# Patient Record
Sex: MALE | Race: WHITE | Employment: UNEMPLOYED | URBAN - METROPOLITAN AREA
[De-identification: names, ages, dates, MRNs, and addresses within clinical notes are randomized per-mention and may not be internally consistent; named-entity substitution may affect disease eponyms.]

---
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
## 2020-02-01 ENCOUNTER — HOSPITAL ENCOUNTER (EMERGENCY)
Age: 20
Discharge: HOME | End: 2020-02-01

## 2020-02-01 VITALS
TEMPERATURE: 97.8 F | HEIGHT: 69 IN | SYSTOLIC BLOOD PRESSURE: 131 MMHG | HEART RATE: 69 BPM | RESPIRATION RATE: 18 BRPM | BODY MASS INDEX: 22.22 KG/M2 | DIASTOLIC BLOOD PRESSURE: 75 MMHG | WEIGHT: 150 LBS | OXYGEN SATURATION: 97 %

## 2020-02-01 PROCEDURE — 99282 EMERGENCY DEPT VISIT SF MDM: CPT

## 2020-02-01 ASSESSMENT — PAIN SCALES - GENERAL: PAINLEVEL_OUTOF10: 6

## 2020-02-01 ASSESSMENT — PAIN DESCRIPTION - LOCATION: LOCATION: THROAT

## 2020-02-01 ASSESSMENT — PAIN DESCRIPTION - PAIN TYPE: TYPE: ACUTE PAIN

## 2020-02-01 NOTE — ED PROVIDER NOTES
eMERGENCY dEPARTMENT eNCOUnter        [address] [facility]    Chief Complaint   Patient presents with    Pharyngitis       HPI    Mirna Davis is a 23 y.o. male who presents with ST, dry cough, headache, left ear pain, body aches for the last 3 days. Girlfriend was sick with similar illness and is better now. Pain is 6 out of 10, worst in the throat, sharp, worse with swallowing, constant. Able to eat and drink. No fevers. No medical problems. REVIEW OF SYSTEMS    See HPI for further details. Review of systems otherwise negative. Constitutional:  No fever. HENT:  + headache, + L earache, + nasal congestion, no sinus pressure, + sore throat  Respiratory:  + cough, no sob. Cardiovascular:  Denies chest pain. GI:  Denies nausea, vomiting. + diarrhea. Musculoskeletal:  Denies edema, tenderness. Integument:  Denies rashes. PAST MEDICAL HISTORY    History reviewed. No pertinent past medical history. SURGICAL HISTORY    History reviewed. No pertinent surgical history. CURRENT MEDICATIONS        ALLERGIES    No Known Allergies    FAMILY HISTORY    History reviewed. No pertinent family history.     SOCIAL HISTORY    Social History     Socioeconomic History    Marital status:      Spouse name: None    Number of children: None    Years of education: None    Highest education level: None   Occupational History    None   Social Needs    Financial resource strain: None    Food insecurity:     Worry: None     Inability: None    Transportation needs:     Medical: None     Non-medical: None   Tobacco Use    Smoking status: Never Smoker    Smokeless tobacco: Never Used   Substance and Sexual Activity    Alcohol use: Never     Frequency: Never    Drug use: Never    Sexual activity: None   Lifestyle    Physical activity:     Days per week: None     Minutes per session: None    Stress: None   Relationships    Social connections:     Talks on phone: None     Gets together: None     Attends Pentecostalism service: None     Active member of club or organization: None     Attends meetings of clubs or organizations: None     Relationship status: None    Intimate partner violence:     Fear of current or ex partner: None     Emotionally abused: None     Physically abused: None     Forced sexual activity: None   Other Topics Concern    None   Social History Narrative    ** Merged History Encounter **            PHYSICAL EXAM    VITAL SIGNS: /75   Pulse 69   Temp 97.8 °F (36.6 °C) (Oral)   Resp 18   Ht 5' 9\" (1.753 m)   Wt 150 lb (68 kg)   SpO2 97%   BMI 22.15 kg/m²   GENERAL:  Well-developed, well-nourished, no acute distress  EYES:   PERRL, EOMI. Conjunctiva normal.  HENT:  NC/AT. External ears normal, canals patent and nonerythematous bilaterally, left EAC 80% obstructed with cerumen, TMs intact and noninjected bilaterally. Nares patent. No sinus tenderness to palpation/percussion. Oropharynx moist and pink. No posterior pharyngeal erythema. no tonsillar edema, no exudates. Uvula midline. LUNGS:  Lungs CTAB, no rales or stridor or wheezing. CARDIAC:   RRR. No murmurs. ABDOMEN:  Abdomen soft, non-tender. Bowel sounds active. EXTREMITIES:   No edema. DP intact and symmetric. SKIN:   Warm and dry. NEURO:  Alert and oriented. No focal deficits. LYMPH:  No cervical lymphadenopathy. RADIOLOGY/PROCEDURES        ED COURSE & MEDICAL DECISION MAKING    Pertinent Labs & Imaging studies reviewed. (See chart for details)  -  Patient seen and evaluated in the emergency department. -  Triage and nursing notes reviewed and incorporated. -  Old chart records reviewed and incorporated. -  I evaluated this patient independently  -  Differential diagnosis includes: upper respiratory infection, sinusitis, influenza, pneumonia, mononucleosis, and others  -  Patient discharged home. Clinical impression is viral illness.   Discussed OTC meds for symptom control and to remove cerumen in left ear.  Recommended increased oral hydration. Patient to follow-up with PCP in 2-3 days. Return to the Emergency Department for new/worsening symptoms as needed. Patient agreeable with plan of care and disposition    FINAL IMPRESSION    1. Viral URI with cough    2.  Impacted cerumen of left ear             Patricia Frost PA-C  02/01/20 2215

## 2020-02-01 NOTE — ED TRIAGE NOTES
Pt presents to the ER with complaints of sore throat; pt states that he has had the sore throat for the past few days

## 2020-07-02 ENCOUNTER — HOSPITAL ENCOUNTER (EMERGENCY)
Age: 20
Discharge: HOME | End: 2020-07-02

## 2020-07-02 ENCOUNTER — APPOINTMENT (OUTPATIENT)
Dept: GENERAL RADIOLOGY | Age: 20
End: 2020-07-02

## 2020-07-02 VITALS
SYSTOLIC BLOOD PRESSURE: 115 MMHG | TEMPERATURE: 98.4 F | HEART RATE: 77 BPM | BODY MASS INDEX: 22.22 KG/M2 | WEIGHT: 150 LBS | DIASTOLIC BLOOD PRESSURE: 80 MMHG | HEIGHT: 69 IN | OXYGEN SATURATION: 98 % | RESPIRATION RATE: 16 BRPM

## 2020-07-02 PROCEDURE — 99283 EMERGENCY DEPT VISIT LOW MDM: CPT

## 2020-07-02 PROCEDURE — 6370000000 HC RX 637 (ALT 250 FOR IP): Performed by: PHYSICIAN ASSISTANT

## 2020-07-02 PROCEDURE — 96374 THER/PROPH/DIAG INJ IV PUSH: CPT

## 2020-07-02 PROCEDURE — 73030 X-RAY EXAM OF SHOULDER: CPT

## 2020-07-02 PROCEDURE — 6360000002 HC RX W HCPCS: Performed by: PHYSICIAN ASSISTANT

## 2020-07-02 RX ORDER — NAPROXEN 500 MG/1
500 TABLET ORAL 2 TIMES DAILY PRN
Qty: 20 TABLET | Refills: 0 | Status: SHIPPED | OUTPATIENT
Start: 2020-07-02

## 2020-07-02 RX ORDER — ACETAMINOPHEN 500 MG
500 TABLET ORAL EVERY 6 HOURS PRN
Qty: 20 TABLET | Refills: 0 | Status: SHIPPED | OUTPATIENT
Start: 2020-07-02

## 2020-07-02 RX ORDER — LIDOCAINE 4 G/G
1 PATCH TOPICAL ONCE
Status: DISCONTINUED | OUTPATIENT
Start: 2020-07-02 | End: 2020-07-02 | Stop reason: HOSPADM

## 2020-07-02 RX ORDER — KETOROLAC TROMETHAMINE 30 MG/ML
30 INJECTION, SOLUTION INTRAMUSCULAR; INTRAVENOUS ONCE
Status: COMPLETED | OUTPATIENT
Start: 2020-07-02 | End: 2020-07-02

## 2020-07-02 RX ADMIN — KETOROLAC TROMETHAMINE 30 MG: 30 INJECTION, SOLUTION INTRAMUSCULAR; INTRAVENOUS at 08:45

## 2020-07-02 ASSESSMENT — PAIN SCALES - GENERAL
PAINLEVEL_OUTOF10: 10
PAINLEVEL_OUTOF10: 10

## 2020-07-02 NOTE — ED NOTES
Pt presents to the ED today with c/o right shoulder pain. Pt states that it started in the middle of the night last night. Pt feels like he injured it at work. Pt denies falling however states that he lifts heavy items and carries them on his shoulder. Dayana FUENTES at bedside assessing patient.       Akhil Navarrete RN  07/02/20 8480

## 2020-07-02 NOTE — ED PROVIDER NOTES
EMERGENCY DEPARTMENT ENCOUNTER      PCP: Bren Yip MD    CHIEF COMPLAINT    Chief Complaint   Patient presents with    Shoulder Injury       This patient was not evaluated by the attending physician. I have independently evaluated this patient. HPI    Valente Colorado is a 21 y.o. male who presents with right shoulder pain. Onset was overnight tonight. 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 He denies any direct trauma or injury to his right shoulder. He denies any specific incident causing known injury to his right shoulder. The duration of right shoulder pain has been constant since the onset. The quality of the pain is aching. Patient reports sensation of \"grinding\" in his right shoulder with movements. The pain does radiate into right arm. The pain worsens with movement and palpation. Pt has tried nothing for relief of pain. Pt denies having this type of shoulder pain in the past.  Patient reports associated occasional tingling in right shoulder but none present at this time. Patient does not wish to do [employer]. Patient denies fever, chills, distal numbness, tingling, weakness, or functional deficit. Patient denies other injuries. Patient denies chest pain, shortness of breath. REVIEW OF SYSTEMS    General: Denies fever or chills  Cardiac: Denies chest pain  Pulmonary: Denies shortness of breath, wheezes, or difficulty breathing  GI: Denies abdominal pain, vomiting, or diarrhea  : No dysuria or hematuria  Musculoskeletal: See HPI; Denies any other musculoskeletal injuries, including chest wall and back. Denies neck pain. Neurologic: Intermittent paresthesias; denies numbness, weakness    All other review of systems are negative  See HPI and nursing notes for additional information     [address]    History reviewed.  No pertinent past medical history. History reviewed. No pertinent surgical history. CURRENT MEDICATIONS        ALLERGIES    No Known Allergies    SOCIAL & FAMILY HISTORY    Social History     Socioeconomic History    Marital status: Single     Spouse name: None    Number of children: None    Years of education: None    Highest education level: None   Occupational History    None   Social Needs    Financial resource strain: None    Food insecurity     Worry: None     Inability: None    Transportation needs     Medical: None     Non-medical: None   Tobacco Use    Smoking status: Never Smoker    Smokeless tobacco: Never Used   Substance and Sexual Activity    Alcohol use: Never     Frequency: Never    Drug use: Never    Sexual activity: None   Lifestyle    Physical activity     Days per week: None     Minutes per session: None    Stress: None   Relationships    Social connections     Talks on phone: None     Gets together: None     Attends Jain service: None     Active member of club or organization: None     Attends meetings of clubs or organizations: None     Relationship status: None    Intimate partner violence     Fear of current or ex partner: None     Emotionally abused: None     Physically abused: None     Forced sexual activity: None   Other Topics Concern    None   Social History Narrative    ** Merged History Encounter **          History reviewed. No pertinent family history. PHYSICAL EXAM    VITAL SIGNS: /80   Pulse 77   Temp 98.4 °F (36.9 °C) (Oral)   Resp 16   Ht 5' 9\" (1.753 m)   Wt 150 lb (68 kg)   SpO2 98%   BMI 22.15 kg/m²   Constitutional:  Well developed, Appears comfortable    Musculoskeletal:    Neck:  No gross swelling or discoloration on inspection. No tenderness to palpation or palpable defect. Full range of motion without pain. Right Shoulder Exam:   -Inspection:   No obvious defects, deformities, or discoloration.  Skin intact   - Palpation: No swelling     No redness, or increased warmth to palpation     + tenderness to palpation of the distal clavicle, AC joint, deltoid, trapezius. No scapular tenderness to palpation. No humeral tenderness to palpation. I am able to palpate grinding of the shoulder joint with overhead movement of right upper extremity. -ROM:   Moderately limited AROM due to pain   -Provocative tests:  + Carbajal, + Wil[place], Negative drop Arm    No swelling, discoloration, tenderness to palpation, or range of motion deficit of the ipsilateral elbow. Strength 5/5 and extremity is distally neurovascularly intact. Vascular: Radial pulses 2+ and equal bilaterally. Integument:  Well hydrated, no rash   Neurologic:  Alert and oriented. Distal sensation intact in right upper extremity in radial, median, ulnar nerve distribution. No functional deficits of elbows, wrists, hands, or fingers. Psychiatric: Cooperative, pleasant affect        RADIOLOGY/PROCEDURES    XR SHOULDER RIGHT (MIN 2 VIEWS)   Final Result   No acute bony abnormality identified. ED COURSE & MEDICAL DECISION MAKING       Vital signs and nursing notes reviewed during ED course. I have independently evaluated this patient. Supervising physician present in the Emergency Department, available for consultation, throughout entirety of patient care. History and exam consistent with musculoskeletal pain of right shoulder concerning for internal derangement including labral injury versus rotator cuff injury. Patient and I discussed the concern for internal derangement today. I recommend rest and ice. While in the ED, patient received IM Toradol and topical lidocaine patch with improvement of pain. Right shoulder x-ray was obtained and reveals no acute osseous abnormality. Affected extremity is distally neurovascularly intact. I have low clinical suspicion for septic joint, nerve injury, vascular injury, ACS.  Patient received prescriptions for naproxen and Tylenol-we discussed medications. Patient is comfortable with discharge at this time. Patient is nontoxic appearing. Vital signs stable. Patient is stable for outpatient management. All pertinent Lab data and radiographic results reviewed with patient at bedside. The patient and/or the family were informed of the results of any tests/labs/imaging, the treatment plan, and time was allotted to answer questions. Diagnosis, disposition, and plan discussed in detail with patient and/or the family today who understands and agrees. Patient understands and agrees to follow up with  orthopedic physician for recheck in 2 to 3 days. Patient understands and agrees to return to the emergency department for any new or worsening symptoms, including but not limited to numbness, weakness, tingling, fever, erythema, swelling, warmth of joints, redness of joints. Clinical  IMPRESSION    1.  Acute pain of right shoulder           Disposition referral (if applicable):  Migdalia Schreiber MD  [address] [place] [address]  [phone]    Call today  [address] in 2-3 days    [facility] Emergency Department  [place] [id number] [phone]  Go to   Return to ED if symptoms worsen or new symptoms      Disposition medications (if applicable):  Discharge Medication List as of 7/2/2020  9:36 AM      START taking these medications    Details   naproxen (NAPROSYN) 500 MG tablet Take 1 tablet by mouth 2 times daily as needed for Pain, Disp-20 tablet, R-0Print      acetaminophen (APAP EXTRA STRENGTH) 500 MG tablet Take 1 tablet by mouth every 6 hours as needed for Pain, Disp-20 tablet, R-0Print             Comment: Please note this report has been produced using speech recognition software and may contain errors related to that system including errors in grammar, punctuation, and spelling, as well as words and phrases that may be inappropriate. If there are any questions or concerns please feel free to contact the dictating provider for clarification.           Sergio Rice PA-C  07/02/20 1039

## 2020-07-02 NOTE — ED NOTES
Discharge instructions reviewed with patient; pt voiced understanding at this time.       Cass Belcher RN  07/02/20 1889

## 2021-08-22 ENCOUNTER — HOSPITAL ENCOUNTER (EMERGENCY)
Age: 21
Discharge: OTHER INSTITUTION | End: 2021-08-23
Attending: STUDENT IN AN ORGANIZED HEALTH CARE EDUCATION/TRAINING PROGRAM

## 2021-08-22 VITALS
DIASTOLIC BLOOD PRESSURE: 62 MMHG | OXYGEN SATURATION: 98 % | WEIGHT: 165 LBS | HEART RATE: 89 BPM | BODY MASS INDEX: 23.1 KG/M2 | HEIGHT: 71 IN | SYSTOLIC BLOOD PRESSURE: 108 MMHG | RESPIRATION RATE: 16 BRPM | TEMPERATURE: 97.3 F

## 2021-08-22 DIAGNOSIS — R45.851 SUICIDAL IDEATION: Primary | ICD-10-CM

## 2021-08-22 LAB
ACETAMINOPHEN LEVEL: <5 UG/ML (ref 15–30)
ALBUMIN SERPL-MCNC: 4.9 GM/DL (ref 3.4–5)
ALCOHOL SCREEN SERUM: <0.01 %WT/VOL
ALP BLD-CCNC: 66 IU/L (ref 40–129)
ALT SERPL-CCNC: 39 U/L (ref 10–40)
AMPHETAMINES: NEGATIVE
ANION GAP SERPL CALCULATED.3IONS-SCNC: 13 MMOL/L (ref 4–16)
AST SERPL-CCNC: 24 IU/L (ref 15–37)
BACTERIA: NEGATIVE /HPF
BARBITURATE SCREEN URINE: NEGATIVE
BASOPHILS ABSOLUTE: 0.1 K/CU MM
BASOPHILS RELATIVE PERCENT: 0.9 % (ref 0–1)
BENZODIAZEPINE SCREEN, URINE: NEGATIVE
BILIRUB SERPL-MCNC: 1.3 MG/DL (ref 0–1)
BILIRUBIN URINE: NEGATIVE MG/DL
BLOOD, URINE: NEGATIVE
BUN BLDV-MCNC: 11 MG/DL (ref 6–23)
CALCIUM SERPL-MCNC: 10 MG/DL (ref 8.3–10.6)
CANNABINOID SCREEN URINE: ABNORMAL
CHLORIDE BLD-SCNC: 101 MMOL/L (ref 99–110)
CLARITY: CLEAR
CO2: 22 MMOL/L (ref 21–32)
COCAINE METABOLITE: NEGATIVE
COLOR: YELLOW
CREAT SERPL-MCNC: 0.7 MG/DL (ref 0.9–1.3)
DIFFERENTIAL TYPE: ABNORMAL
DOSE AMOUNT: ABNORMAL
DOSE AMOUNT: ABNORMAL
DOSE TIME: ABNORMAL
DOSE TIME: ABNORMAL
EOSINOPHILS ABSOLUTE: 0 K/CU MM
EOSINOPHILS RELATIVE PERCENT: 0.3 % (ref 0–3)
GFR AFRICAN AMERICAN: >60 ML/MIN/1.73M2
GFR NON-AFRICAN AMERICAN: >60 ML/MIN/1.73M2
GLUCOSE BLD-MCNC: 103 MG/DL (ref 70–99)
GLUCOSE, URINE: NEGATIVE MG/DL
HCT VFR BLD CALC: 46.7 % (ref 42–52)
HEMOGLOBIN: 15.7 GM/DL (ref 13.5–18)
IMMATURE NEUTROPHIL %: 0.4 % (ref 0–0.43)
KETONES, URINE: ABNORMAL MG/DL
LEUKOCYTE ESTERASE, URINE: NEGATIVE
LYMPHOCYTES ABSOLUTE: 2.2 K/CU MM
LYMPHOCYTES RELATIVE PERCENT: 22.6 % (ref 24–44)
MAGNESIUM: 2 MG/DL (ref 1.8–2.4)
MCH RBC QN AUTO: 28.1 PG (ref 27–31)
MCHC RBC AUTO-ENTMCNC: 33.6 % (ref 32–36)
MCV RBC AUTO: 83.7 FL (ref 78–100)
MONOCYTES ABSOLUTE: 0.6 K/CU MM
MONOCYTES RELATIVE PERCENT: 6.5 % (ref 0–4)
MUCUS: ABNORMAL HPF
NITRITE URINE, QUANTITATIVE: NEGATIVE
NUCLEATED RBC %: 0 %
OPIATES, URINE: NEGATIVE
OXYCODONE: NEGATIVE
PDW BLD-RTO: 12.9 % (ref 11.7–14.9)
PH, URINE: 8 (ref 5–8)
PHENCYCLIDINE, URINE: NEGATIVE
PLATELET # BLD: 295 K/CU MM (ref 140–440)
PMV BLD AUTO: 10.1 FL (ref 7.5–11.1)
POTASSIUM SERPL-SCNC: 3.5 MMOL/L (ref 3.5–5.1)
PROTEIN UA: NEGATIVE MG/DL
RBC # BLD: 5.58 M/CU MM (ref 4.6–6.2)
RBC URINE: <1 /HPF (ref 0–3)
SALICYLATE LEVEL: <0.3 MG/DL (ref 15–30)
SARS-COV-2, NAAT: NOT DETECTED
SEGMENTED NEUTROPHILS ABSOLUTE COUNT: 6.6 K/CU MM
SEGMENTED NEUTROPHILS RELATIVE PERCENT: 69.3 % (ref 36–66)
SODIUM BLD-SCNC: 136 MMOL/L (ref 135–145)
SOURCE: NORMAL
SPECIFIC GRAVITY UA: 1.02 (ref 1–1.03)
TOTAL IMMATURE NEUTOROPHIL: 0.04 K/CU MM
TOTAL NUCLEATED RBC: 0 K/CU MM
TOTAL PROTEIN: 7.5 GM/DL (ref 6.4–8.2)
TRICHOMONAS: ABNORMAL /HPF
UROBILINOGEN, URINE: 2 MG/DL (ref 0.2–1)
WBC # BLD: 9.6 K/CU MM (ref 4–10.5)
WBC UA: 1 /HPF (ref 0–2)

## 2021-08-22 PROCEDURE — 6370000000 HC RX 637 (ALT 250 FOR IP): Performed by: STUDENT IN AN ORGANIZED HEALTH CARE EDUCATION/TRAINING PROGRAM

## 2021-08-22 PROCEDURE — 87635 SARS-COV-2 COVID-19 AMP PRB: CPT

## 2021-08-22 PROCEDURE — 80061 LIPID PANEL: CPT

## 2021-08-22 PROCEDURE — 80053 COMPREHEN METABOLIC PANEL: CPT

## 2021-08-22 PROCEDURE — G0480 DRUG TEST DEF 1-7 CLASSES: HCPCS

## 2021-08-22 PROCEDURE — 84443 ASSAY THYROID STIM HORMONE: CPT

## 2021-08-22 PROCEDURE — 99285 EMERGENCY DEPT VISIT HI MDM: CPT

## 2021-08-22 PROCEDURE — 83721 ASSAY OF BLOOD LIPOPROTEIN: CPT

## 2021-08-22 PROCEDURE — 85025 COMPLETE CBC W/AUTO DIFF WBC: CPT

## 2021-08-22 PROCEDURE — 80307 DRUG TEST PRSMV CHEM ANLYZR: CPT

## 2021-08-22 PROCEDURE — 81001 URINALYSIS AUTO W/SCOPE: CPT

## 2021-08-22 PROCEDURE — 83735 ASSAY OF MAGNESIUM: CPT

## 2021-08-22 RX ORDER — LORAZEPAM 1 MG/1
2 TABLET ORAL ONCE
Status: COMPLETED | OUTPATIENT
Start: 2021-08-22 | End: 2021-08-22

## 2021-08-22 RX ADMIN — LORAZEPAM 2 MG: 1 TABLET ORAL at 19:22

## 2021-08-22 NOTE — ED PROVIDER NOTES
Ran Out of Food in the Last Year:    Transportation Needs:     Lack of Transportation (Medical):  Lack of Transportation (Non-Medical):    Physical Activity:     Days of Exercise per Week:     Minutes of Exercise per Session:    Stress:     Feeling of Stress :    Social Connections:     Frequency of Communication with Friends and Family:     Frequency of Social Gatherings with Friends and Family:     Attends Catholic Services:     Active Member of Clubs or Organizations:     Attends Club or Organization Meetings:     Marital Status:    Intimate Partner Violence:     Fear of Current or Ex-Partner:     Emotionally Abused:     Physically Abused:     Sexually Abused:      No current facility-administered medications for this encounter. Current Outpatient Medications   Medication Sig Dispense Refill    naproxen (NAPROSYN) 500 MG tablet Take 1 tablet by mouth 2 times daily as needed for Pain 20 tablet 0    acetaminophen (APAP EXTRA STRENGTH) 500 MG tablet Take 1 tablet by mouth every 6 hours as needed for Pain 20 tablet 0     No Known Allergies    Nursing Notes Reviewed    Physical Exam:  Triage VS:    ED Triage Vitals [08/22/21 1349]   Enc Vitals Group      BP       Pulse       Resp       Temp       Temp src       SpO2       Weight 165 lb (74.8 kg)      Height 5' 11\" (1.803 m)      Head Circumference       Peak Flow       Pain Score       Pain Loc       Pain Edu? Excl. in [address]? My pulse ox interpretation is - normal    General appearance:  No acute distress. Skin:  Warm. Dry. Eye:  Extraocular movements intact. Ears, nose, mouth and throat:  Oral mucosa moist   Neck:  Trachea midline. Extremity:  No swelling. Normal ROM     Heart:  Regular rate and rhythm, normal S1 & S2, no extra heart sounds. Perfusion:  intact  Respiratory:  Lungs clear to auscultation bilaterally. Respirations nonlabored. Abdominal:  Normal bowel sounds. Soft. Nontender. Non distended.   Back: No CVA tenderness to palpation     Neurological:  Alert and oriented times 3. No focal neuro deficits. Psychiatric: Flat affect, depressed    I have reviewed and interpreted all of the currently available lab results from this visit (if applicable):  No results found for this visit on 08/22/21. Radiographs (if obtained):  Radiologist's Report Reviewed:  No results found. MDM:  25-year-old male presenting with suicidal ideation. History and be seen above. Vitals on presentation are reassuring and patient afebrile satting on room air. Physical exam is grossly unremarkable. Psychiatric evaluation includes CBC, CMP, ethanol, salicylates, Tylenol levels are reassuring. Rapid Covid negative. Patient is medically clear at this time. Behavioral health saw patient believe patient will benefit from inpatient stay. Patient will be admitted for surgery further psychiatric evaluation    Clinical Impression:  1. Suicidal ideation          Comment: Please note this report has been produced using speech recognition software and may contain errors related to that system including errors in grammar, punctuation, and spelling, as well as words and phrases that may be inappropriate. Efforts were made to edit the dictations.         Devon Stevens MD  08/22/21 0254

## 2021-08-22 NOTE — ED TRIAGE NOTES
To ED per roma for suicidal thoughts,patient states he just broke up with his girlfriend and has a lot of family stress

## 2021-08-23 LAB
CHOLESTEROL: 189 MG/DL
HDLC SERPL-MCNC: 63 MG/DL
LDL CHOLESTEROL DIRECT: 118 MG/DL
TRIGL SERPL-MCNC: 37 MG/DL
TSH HIGH SENSITIVITY: 1.58 UIU/ML (ref 0.27–4.2)

## 2021-08-23 NOTE — ED NOTES
Bed: H-07  Expected date:   Expected time:   Means of arrival:   Comments:  raúl Miller  08/22/21 0795
Called [employer] RN @ [facility], brief chart review completed, they have open beds. Destiny Sims has no insurance and is a [facility], will seek placement @ [facility]. Called Belle DORANTES @ [facility], patient will be referred to [facility] for assistance with placement @ [facility] when medically cleared,    Dr. Anthony Amaya ED Physician requested to provide statement that patient is medically clear.      Kaley Burns RN  87/02/03 2035
Medical Clearance is now documented by Dr. Lalitha Reina. [facility] is notified of the above and chart will be faxed for review to [facility].      Charito Gibson RN  30/74/72 4406
Patient agitated speaking with [facility] nurse,security at bedside     January Grubbs [place]  08/22/21 1443
Patient updated that [facility] is reviewing chart. Verbalized return understanding.      Kelley Barker RN  54/84/16 8198
Report and paperwork given to superior transport crew, all questions answered and care transferred at this time, pt A&Ox4     Linda Masters RN  08/23/21 3461
Report given to mark Gregorio RN  08/22/21 0703
evaluation and safety. Shared above with Arely Islas ED Physician, he recommends Involuntary Psychiatric Admission. Will seek placement. C-SSRS Summary:     Patient:As above  Family: See above  Agency: No current treatment or Rx Meds      Present Suicidal Behavior:      Verbal: See above    Attempt: Plan as per above. - No shared attempt. Past Suicidal Behavior:    Verbal: Denies    Attempt: Denies      Self-Injurious/Self-Mutilation: None is shared    Trauma Identified: 5 year relationship with girlfriend ended today. Protective Factors:    None are identified at this time. Risk Factors:    Suicide Plan  Shares History of ADHD  Grief and Loss Issues  Relationship Problems  Family Dysfunction  Cannabis Use Per Urine Drug Screen Results    Clinical Summary:    As above.   Seeking Placement    Electronically signed by Peña Lee RN on 1/36/1463 at 3:37 PM     Peña Lee RN  55/99/21 2014

## 2024-03-11 ENCOUNTER — APPOINTMENT (OUTPATIENT)
Dept: GENERAL RADIOLOGY | Age: 24
End: 2024-03-11

## 2024-03-11 ENCOUNTER — HOSPITAL ENCOUNTER (EMERGENCY)
Age: 24
Discharge: HOME | End: 2024-03-12
Attending: EMERGENCY MEDICINE

## 2024-03-11 VITALS
OXYGEN SATURATION: 98 % | WEIGHT: 150 LBS | TEMPERATURE: 98.7 F | RESPIRATION RATE: 16 BRPM | SYSTOLIC BLOOD PRESSURE: 117 MMHG | HEIGHT: 71 IN | HEART RATE: 99 BPM | BODY MASS INDEX: 21 KG/M2 | DIASTOLIC BLOOD PRESSURE: 85 MMHG

## 2024-03-11 DIAGNOSIS — S93.401A SPRAIN OF RIGHT ANKLE, UNSPECIFIED LIGAMENT, INITIAL ENCOUNTER: Primary | ICD-10-CM

## 2024-03-11 PROCEDURE — 99283 EMERGENCY DEPT VISIT LOW MDM: CPT

## 2024-03-11 PROCEDURE — 73610 X-RAY EXAM OF ANKLE: CPT

## 2024-03-11 RX ORDER — HYDROCODONE BITARTRATE AND ACETAMINOPHEN 5; 325 MG/1; MG/1
1 TABLET ORAL ONCE
Status: COMPLETED | OUTPATIENT
Start: 2024-03-12 | End: 2024-03-12

## 2024-03-11 RX ORDER — IBUPROFEN 400 MG/1
800 TABLET ORAL ONCE
Status: COMPLETED | OUTPATIENT
Start: 2024-03-12 | End: 2024-03-12

## 2024-03-11 ASSESSMENT — PAIN DESCRIPTION - DESCRIPTORS: DESCRIPTORS: SHARP

## 2024-03-11 ASSESSMENT — PAIN - FUNCTIONAL ASSESSMENT
PAIN_FUNCTIONAL_ASSESSMENT: PREVENTS OR INTERFERES SOME ACTIVE ACTIVITIES AND ADLS
PAIN_FUNCTIONAL_ASSESSMENT: 0-10

## 2024-03-11 ASSESSMENT — PAIN DESCRIPTION - LOCATION: LOCATION: ANKLE

## 2024-03-11 ASSESSMENT — PAIN SCALES - GENERAL: PAINLEVEL_OUTOF10: 8

## 2024-03-11 ASSESSMENT — PAIN DESCRIPTION - ORIENTATION: ORIENTATION: RIGHT

## 2024-03-11 ASSESSMENT — PAIN DESCRIPTION - PAIN TYPE: TYPE: ACUTE PAIN

## 2024-03-12 PROCEDURE — 6370000000 HC RX 637 (ALT 250 FOR IP): Performed by: EMERGENCY MEDICINE

## 2024-03-12 RX ORDER — IBUPROFEN 800 MG/1
800 TABLET ORAL EVERY 8 HOURS PRN
Qty: 24 TABLET | Refills: 0 | Status: SHIPPED | OUTPATIENT
Start: 2024-03-12

## 2024-03-12 RX ORDER — HYDROCODONE BITARTRATE AND ACETAMINOPHEN 5; 325 MG/1; MG/1
1 TABLET ORAL EVERY 6 HOURS PRN
Qty: 12 TABLET | Refills: 0 | Status: SHIPPED | OUTPATIENT
Start: 2024-03-12 | End: 2024-03-15

## 2024-03-12 RX ADMIN — IBUPROFEN 800 MG: 400 TABLET, FILM COATED ORAL at 00:07

## 2024-03-12 RX ADMIN — HYDROCODONE BITARTRATE AND ACETAMINOPHEN 1 TABLET: 5; 325 TABLET ORAL at 00:07

## 2024-03-12 NOTE — ED PROVIDER NOTES
EMERGENCY DEPARTMENT ENCOUNTER      CHIEF COMPLAINT:   Right ankle pain    HPI: Shyam Swanson is a 23 y.o. male who presents to the Emergency Department complaining of right ankle pain and swelling.  The patient states that he was playing basketball approximately 2 hours prior to arrival and landed from a jump, rolling his right ankle.  It has become progressively more swollen since the injury.  He states that it is painful to bear weight and move.  The pain is better with rest.  It feels achy and sharp in nature.  He denies any associated numbness, tingling or weakness.  He denies any other complaints.    REVIEW OF SYSTEMS:  CONSTITUTIONAL:  Denies fever, chills, weight loss or weakness  EYES:  Denies photophobia or discharge  ENT:  Denies sore throat or ear pain  CARDIOVASCULAR:  Denies chest pain, palpitations or swelling  RESPIRATORY:  Denies cough or shortness of breath  GI: Denies abdominal pain, nausea, vomiting, or diarrhea  MUSCULOSKELETAL: See HPI  SKIN:  No rash  NEUROLOGIC:  Denies headache, focal weakness or sensory changes  All systems negative except as marked.  \"Remaining review of systems reviewed and negative. I have reviewed the nursing triage documentation and agree unless otherwise noted below.\"    PAST MEDICAL HISTORY:   History reviewed. No pertinent past medical history.    CURRENT MEDICATIONS:   Home medications reviewed.    SURGICAL HISTORY:   History reviewed. No pertinent surgical history.    FAMILY HISTORY:   History reviewed. No pertinent family history.    SOCIAL HISTORY:   Social History     Socioeconomic History    Marital status: Single     Spouse name: Not on file    Number of children: Not on file    Years of education: Not on file    Highest education level: Not on file   Occupational History    Not on file   Tobacco Use    Smoking status: Never    Smokeless tobacco: Never   Vaping Use    Vaping Use: Every day   Substance and Sexual Activity    Alcohol use: Yes    Drug use: